# Patient Record
Sex: FEMALE | URBAN - METROPOLITAN AREA
[De-identification: names, ages, dates, MRNs, and addresses within clinical notes are randomized per-mention and may not be internally consistent; named-entity substitution may affect disease eponyms.]

---

## 2017-02-07 ENCOUNTER — APPOINTMENT (OUTPATIENT)
Age: 72
Setting detail: DERMATOLOGY
End: 2017-02-09

## 2017-02-07 DIAGNOSIS — D49.2 NEOPLASM OF UNSPECIFIED BEHAVIOR OF BONE, SOFT TISSUE, AND SKIN: ICD-10-CM

## 2017-02-07 DIAGNOSIS — L57.8 OTHER SKIN CHANGES DUE TO CHRONIC EXPOSURE TO NONIONIZING RADIATION: ICD-10-CM

## 2017-02-07 DIAGNOSIS — L81.4 OTHER MELANIN HYPERPIGMENTATION: ICD-10-CM

## 2017-02-07 DIAGNOSIS — D22 MELANOCYTIC NEVI: ICD-10-CM

## 2017-02-07 DIAGNOSIS — L82.1 OTHER SEBORRHEIC KERATOSIS: ICD-10-CM

## 2017-02-07 DIAGNOSIS — Z71.89 OTHER SPECIFIED COUNSELING: ICD-10-CM

## 2017-02-07 PROBLEM — D22.5 MELANOCYTIC NEVI OF TRUNK: Status: ACTIVE | Noted: 2017-02-07

## 2017-02-07 PROBLEM — D22.62 MELANOCYTIC NEVI OF LEFT UPPER LIMB, INCLUDING SHOULDER: Status: ACTIVE | Noted: 2017-02-07

## 2017-02-07 PROBLEM — D22.71 MELANOCYTIC NEVI OF RIGHT LOWER LIMB, INCLUDING HIP: Status: ACTIVE | Noted: 2017-02-07

## 2017-02-07 PROBLEM — D22.61 MELANOCYTIC NEVI OF RIGHT UPPER LIMB, INCLUDING SHOULDER: Status: ACTIVE | Noted: 2017-02-07

## 2017-02-07 PROBLEM — D22.72 MELANOCYTIC NEVI OF LEFT LOWER LIMB, INCLUDING HIP: Status: ACTIVE | Noted: 2017-02-07

## 2017-02-07 PROCEDURE — 11101: CPT

## 2017-02-07 PROCEDURE — 99203 OFFICE O/P NEW LOW 30 MIN: CPT | Mod: 25

## 2017-02-07 PROCEDURE — OTHER COUNSELING: OTHER

## 2017-02-07 PROCEDURE — OTHER BIOPSY BY SHAVE METHOD: OTHER

## 2017-02-07 PROCEDURE — 11100: CPT

## 2017-02-07 ASSESSMENT — LOCATION SIMPLE DESCRIPTION DERM
LOCATION SIMPLE: RIGHT THIGH
LOCATION SIMPLE: LEFT UPPER ARM
LOCATION SIMPLE: LEFT POSTERIOR UPPER ARM
LOCATION SIMPLE: LEFT HAND
LOCATION SIMPLE: RIGHT FOREHEAD
LOCATION SIMPLE: UPPER BACK
LOCATION SIMPLE: RIGHT CALF
LOCATION SIMPLE: RIGHT PRETIBIAL REGION
LOCATION SIMPLE: RIGHT CHEEK
LOCATION SIMPLE: LEFT CHEEK
LOCATION SIMPLE: LEFT FOREARM
LOCATION SIMPLE: RIGHT POSTERIOR THIGH
LOCATION SIMPLE: LEFT POSTERIOR THIGH
LOCATION SIMPLE: CHEST
LOCATION SIMPLE: LEFT CALF
LOCATION SIMPLE: ABDOMEN
LOCATION SIMPLE: RIGHT SHOULDER
LOCATION SIMPLE: RIGHT FOREARM
LOCATION SIMPLE: RIGHT LOWER BACK
LOCATION SIMPLE: LEFT THIGH
LOCATION SIMPLE: RIGHT POSTERIOR UPPER ARM
LOCATION SIMPLE: RIGHT UPPER ARM
LOCATION SIMPLE: LEFT UPPER BACK
LOCATION SIMPLE: RIGHT HAND
LOCATION SIMPLE: LEFT FOREHEAD

## 2017-02-07 ASSESSMENT — LOCATION DETAILED DESCRIPTION DERM
LOCATION DETAILED: RIGHT MEDIAL SUPERIOR CHEST
LOCATION DETAILED: LEFT VENTRAL DISTAL FOREARM
LOCATION DETAILED: INFERIOR THORACIC SPINE
LOCATION DETAILED: LEFT DISTAL CALF
LOCATION DETAILED: RIGHT PROXIMAL PRETIBIAL REGION
LOCATION DETAILED: LEFT DISTAL POSTERIOR THIGH
LOCATION DETAILED: LEFT SUPERIOR MEDIAL UPPER BACK
LOCATION DETAILED: RIGHT RADIAL DORSAL HAND
LOCATION DETAILED: RIGHT ANTERIOR PROXIMAL THIGH
LOCATION DETAILED: MIDDLE STERNUM
LOCATION DETAILED: LEFT LATERAL SUPERIOR CHEST
LOCATION DETAILED: LEFT SUPERIOR CENTRAL BUCCAL CHEEK
LOCATION DETAILED: RIGHT CENTRAL MALAR CHEEK
LOCATION DETAILED: LEFT CENTRAL MALAR CHEEK
LOCATION DETAILED: RIGHT DISTAL POSTERIOR UPPER ARM
LOCATION DETAILED: RIGHT VENTRAL DISTAL FOREARM
LOCATION DETAILED: RIGHT DISTAL POSTERIOR THIGH
LOCATION DETAILED: LEFT PROXIMAL DORSAL FOREARM
LOCATION DETAILED: RIGHT INFERIOR MEDIAL FOREHEAD
LOCATION DETAILED: RIGHT INFERIOR MEDIAL MIDBACK
LOCATION DETAILED: RIGHT PROXIMAL DORSAL FOREARM
LOCATION DETAILED: RIGHT ANTERIOR DISTAL UPPER ARM
LOCATION DETAILED: LEFT ANTERIOR DISTAL THIGH
LOCATION DETAILED: LEFT INFERIOR FOREHEAD
LOCATION DETAILED: RIGHT ANTERIOR SHOULDER
LOCATION DETAILED: LEFT ANTERIOR DISTAL UPPER ARM
LOCATION DETAILED: PERIUMBILICAL SKIN
LOCATION DETAILED: LEFT RADIAL DORSAL HAND
LOCATION DETAILED: LEFT INFERIOR LATERAL FOREHEAD
LOCATION DETAILED: LEFT PROXIMAL POSTERIOR UPPER ARM
LOCATION DETAILED: RIGHT PROXIMAL CALF

## 2017-02-07 ASSESSMENT — LOCATION ZONE DERM
LOCATION ZONE: HAND
LOCATION ZONE: LEG
LOCATION ZONE: FACE
LOCATION ZONE: TRUNK
LOCATION ZONE: ARM

## 2017-02-07 NOTE — PROCEDURE: BIOPSY BY SHAVE METHOD
Post-Care Instructions: I reviewed with the patient in detail post-care instructions. Patient is to keep the biopsy site dry overnight, and then apply bacitracin twice daily until healed. Patient may apply hydrogen peroxide soaks to remove any crusting.
Electrodesiccation And Curettage Text: The wound bed was treated with electrodesiccation and curettage after the biopsy was performed.
Curettage Text: The wound bed was treated with curettage after the biopsy was performed.
Render Post-Care Instructions In Note?: yes
Electrodesiccation Text: The wound bed was treated with electrodesiccation after the biopsy was performed.
Consent: Written consent was obtained and risks were reviewed including but not limited to scarring, infection, bleeding, scabbing, incomplete removal, nerve damage and allergy to anesthesia.
Additional Anesthesia Volume In Cc (Will Not Render If 0): 0
Silver Nitrate Text: The wound bed was treated with silver nitrate after the biopsy was performed.
Anesthesia Type: 1% lidocaine with epinephrine and a 1:10 solution of 8.4% sodium bicarbonate
Notification Instructions: Patient will be notified of biopsy results. However, patient instructed to call the office if not contacted within 2 weeks.
Bill For Surgical Tray: no
Anesthesia Volume In Cc (Will Not Render If 0): 1
Biopsy Method: Double edge Personna blades
Dressing: pressure dressing
Wound Care: Vaseline
Type Of Destruction Used: Curettage
Cryotherapy Text: The wound bed was treated with cryotherapy after the biopsy was performed.
Billing Type: Third-Party Bill
Detail Level: Detailed
Biopsy Type: H and E
Hemostasis: Drysol and Electrocautery

## 2017-11-07 ENCOUNTER — APPOINTMENT (OUTPATIENT)
Age: 72
Setting detail: DERMATOLOGY
End: 2017-11-08

## 2017-11-07 DIAGNOSIS — L82.1 OTHER SEBORRHEIC KERATOSIS: ICD-10-CM

## 2017-11-07 DIAGNOSIS — D22 MELANOCYTIC NEVI: ICD-10-CM

## 2017-11-07 DIAGNOSIS — D18.0 HEMANGIOMA: ICD-10-CM

## 2017-11-07 DIAGNOSIS — L57.8 OTHER SKIN CHANGES DUE TO CHRONIC EXPOSURE TO NONIONIZING RADIATION: ICD-10-CM

## 2017-11-07 DIAGNOSIS — Z71.89 OTHER SPECIFIED COUNSELING: ICD-10-CM

## 2017-11-07 DIAGNOSIS — L85.3 XEROSIS CUTIS: ICD-10-CM

## 2017-11-07 DIAGNOSIS — L81.4 OTHER MELANIN HYPERPIGMENTATION: ICD-10-CM

## 2017-11-07 PROBLEM — D18.01 HEMANGIOMA OF SKIN AND SUBCUTANEOUS TISSUE: Status: ACTIVE | Noted: 2017-11-07

## 2017-11-07 PROBLEM — D22.39 MELANOCYTIC NEVI OF OTHER PARTS OF FACE: Status: ACTIVE | Noted: 2017-11-07

## 2017-11-07 PROBLEM — D22.5 MELANOCYTIC NEVI OF TRUNK: Status: ACTIVE | Noted: 2017-11-07

## 2017-11-07 PROBLEM — D22.72 MELANOCYTIC NEVI OF LEFT LOWER LIMB, INCLUDING HIP: Status: ACTIVE | Noted: 2017-11-07

## 2017-11-07 PROBLEM — D22.61 MELANOCYTIC NEVI OF RIGHT UPPER LIMB, INCLUDING SHOULDER: Status: ACTIVE | Noted: 2017-11-07

## 2017-11-07 PROBLEM — D22.71 MELANOCYTIC NEVI OF RIGHT LOWER LIMB, INCLUDING HIP: Status: ACTIVE | Noted: 2017-11-07

## 2017-11-07 PROBLEM — D22.62 MELANOCYTIC NEVI OF LEFT UPPER LIMB, INCLUDING SHOULDER: Status: ACTIVE | Noted: 2017-11-07

## 2017-11-07 PROCEDURE — OTHER COUNSELING: OTHER

## 2017-11-07 PROCEDURE — OTHER REASSURANCE: OTHER

## 2017-11-07 PROCEDURE — OTHER MIPS QUALITY: OTHER

## 2017-11-07 PROCEDURE — 99214 OFFICE O/P EST MOD 30 MIN: CPT

## 2017-11-07 ASSESSMENT — LOCATION DETAILED DESCRIPTION DERM
LOCATION DETAILED: LEFT PROXIMAL PRETIBIAL REGION
LOCATION DETAILED: LEFT CHIN
LOCATION DETAILED: UPPER STERNUM
LOCATION DETAILED: LEFT LATERAL PROXIMAL PRETIBIAL REGION
LOCATION DETAILED: LEFT PROXIMAL DORSAL FOREARM
LOCATION DETAILED: RIGHT PROXIMAL DORSAL FOREARM
LOCATION DETAILED: RIGHT ANTERIOR DISTAL THIGH
LOCATION DETAILED: LEFT INFERIOR CENTRAL MALAR CHEEK
LOCATION DETAILED: LEFT CENTRAL BUCCAL CHEEK
LOCATION DETAILED: LEFT VENTRAL PROXIMAL FOREARM
LOCATION DETAILED: RIGHT VENTRAL PROXIMAL FOREARM
LOCATION DETAILED: SUPERIOR THORACIC SPINE
LOCATION DETAILED: XIPHOID
LOCATION DETAILED: LOWER STERNUM
LOCATION DETAILED: RIGHT PROXIMAL RADIAL DORSAL FOREARM
LOCATION DETAILED: RIGHT DISTAL CALF
LOCATION DETAILED: LEFT ANTERIOR DISTAL THIGH
LOCATION DETAILED: MIDDLE STERNUM
LOCATION DETAILED: RIGHT PROXIMAL PRETIBIAL REGION
LOCATION DETAILED: LEFT PROXIMAL CALF

## 2017-11-07 ASSESSMENT — LOCATION ZONE DERM
LOCATION ZONE: TRUNK
LOCATION ZONE: LEG
LOCATION ZONE: ARM
LOCATION ZONE: FACE

## 2017-11-07 ASSESSMENT — LOCATION SIMPLE DESCRIPTION DERM
LOCATION SIMPLE: RIGHT CALF
LOCATION SIMPLE: RIGHT PRETIBIAL REGION
LOCATION SIMPLE: LEFT THIGH
LOCATION SIMPLE: UPPER BACK
LOCATION SIMPLE: RIGHT FOREARM
LOCATION SIMPLE: ABDOMEN
LOCATION SIMPLE: LEFT FOREARM
LOCATION SIMPLE: RIGHT THIGH
LOCATION SIMPLE: LEFT PRETIBIAL REGION
LOCATION SIMPLE: LEFT CALF
LOCATION SIMPLE: LEFT CHEEK
LOCATION SIMPLE: CHEST
LOCATION SIMPLE: CHIN

## 2017-11-07 NOTE — PROCEDURE: MIPS QUALITY
Quality 111:Pneumonia Vaccination Status For Older Adults: Pneumococcal Vaccination Previously Received
Detail Level: Zone
Quality 226: Preventive Care And Screening: Tobacco Use: Screening And Cessation Intervention: Patient screened for tobacco and never smoked
Quality 431: Preventive Care And Screening: Unhealthy Alcohol Use - Screening: Patient screened for unhealthy alcohol use using a single question and scores less than 2 times per year
Quality 110: Preventive Care And Screening: Influenza Immunization: Influenza Immunization not Administered because Patient Refused.

## 2018-09-11 ENCOUNTER — APPOINTMENT (OUTPATIENT)
Age: 73
Setting detail: DERMATOLOGY
End: 2018-09-11

## 2018-09-11 DIAGNOSIS — D22 MELANOCYTIC NEVI: ICD-10-CM

## 2018-09-11 DIAGNOSIS — D18.0 HEMANGIOMA: ICD-10-CM

## 2018-09-11 DIAGNOSIS — L57.8 OTHER SKIN CHANGES DUE TO CHRONIC EXPOSURE TO NONIONIZING RADIATION: ICD-10-CM

## 2018-09-11 DIAGNOSIS — Z71.89 OTHER SPECIFIED COUNSELING: ICD-10-CM

## 2018-09-11 DIAGNOSIS — L82.1 OTHER SEBORRHEIC KERATOSIS: ICD-10-CM

## 2018-09-11 DIAGNOSIS — L81.4 OTHER MELANIN HYPERPIGMENTATION: ICD-10-CM

## 2018-09-11 PROBLEM — D22.62 MELANOCYTIC NEVI OF LEFT UPPER LIMB, INCLUDING SHOULDER: Status: ACTIVE | Noted: 2018-09-11

## 2018-09-11 PROBLEM — D22.71 MELANOCYTIC NEVI OF RIGHT LOWER LIMB, INCLUDING HIP: Status: ACTIVE | Noted: 2018-09-11

## 2018-09-11 PROBLEM — D22.39 MELANOCYTIC NEVI OF OTHER PARTS OF FACE: Status: ACTIVE | Noted: 2018-09-11

## 2018-09-11 PROBLEM — D22.72 MELANOCYTIC NEVI OF LEFT LOWER LIMB, INCLUDING HIP: Status: ACTIVE | Noted: 2018-09-11

## 2018-09-11 PROBLEM — D22.61 MELANOCYTIC NEVI OF RIGHT UPPER LIMB, INCLUDING SHOULDER: Status: ACTIVE | Noted: 2018-09-11

## 2018-09-11 PROBLEM — D22.5 MELANOCYTIC NEVI OF TRUNK: Status: ACTIVE | Noted: 2018-09-11

## 2018-09-11 PROBLEM — D18.01 HEMANGIOMA OF SKIN AND SUBCUTANEOUS TISSUE: Status: ACTIVE | Noted: 2018-09-11

## 2018-09-11 PROCEDURE — OTHER COUNSELING: OTHER

## 2018-09-11 PROCEDURE — 99214 OFFICE O/P EST MOD 30 MIN: CPT

## 2018-09-11 PROCEDURE — OTHER MIPS QUALITY: OTHER

## 2018-09-11 PROCEDURE — OTHER REASSURANCE: OTHER

## 2018-09-11 ASSESSMENT — LOCATION DETAILED DESCRIPTION DERM
LOCATION DETAILED: XIPHOID
LOCATION DETAILED: LEFT CENTRAL BUCCAL CHEEK
LOCATION DETAILED: RIGHT PROXIMAL PRETIBIAL REGION
LOCATION DETAILED: LEFT PROXIMAL DORSAL FOREARM
LOCATION DETAILED: LEFT LATERAL PROXIMAL PRETIBIAL REGION
LOCATION DETAILED: LOWER STERNUM
LOCATION DETAILED: MIDDLE STERNUM
LOCATION DETAILED: LEFT ANTERIOR DISTAL THIGH
LOCATION DETAILED: LEFT CHIN
LOCATION DETAILED: UPPER STERNUM
LOCATION DETAILED: LEFT INFERIOR CENTRAL MALAR CHEEK
LOCATION DETAILED: LEFT VENTRAL PROXIMAL FOREARM
LOCATION DETAILED: RIGHT PROXIMAL DORSAL FOREARM
LOCATION DETAILED: RIGHT VENTRAL PROXIMAL FOREARM
LOCATION DETAILED: RIGHT ANTERIOR DISTAL THIGH
LOCATION DETAILED: LEFT PROXIMAL PRETIBIAL REGION

## 2018-09-11 ASSESSMENT — LOCATION SIMPLE DESCRIPTION DERM
LOCATION SIMPLE: LEFT THIGH
LOCATION SIMPLE: CHIN
LOCATION SIMPLE: CHEST
LOCATION SIMPLE: LEFT FOREARM
LOCATION SIMPLE: RIGHT PRETIBIAL REGION
LOCATION SIMPLE: RIGHT THIGH
LOCATION SIMPLE: LEFT PRETIBIAL REGION
LOCATION SIMPLE: LEFT CHEEK
LOCATION SIMPLE: RIGHT FOREARM
LOCATION SIMPLE: ABDOMEN

## 2018-09-11 ASSESSMENT — LOCATION ZONE DERM
LOCATION ZONE: FACE
LOCATION ZONE: ARM
LOCATION ZONE: TRUNK
LOCATION ZONE: LEG

## 2018-09-11 NOTE — PROCEDURE: MIPS QUALITY
Quality 226: Preventive Care And Screening: Tobacco Use: Screening And Cessation Intervention: Patient screened for tobacco and never smoked
Quality 110: Preventive Care And Screening: Influenza Immunization: Influenza Immunization previously received during influenza season
Quality 111:Pneumonia Vaccination Status For Older Adults: Pneumococcal Vaccination Previously Received
Quality 431: Preventive Care And Screening: Unhealthy Alcohol Use - Screening: Patient screened for unhealthy alcohol use using a single question and scores 2 or greater episodes per year and brief intervention did not occur
Detail Level: Zone

## 2021-05-26 NOTE — PROCEDURE: REASSURANCE
You will need to return to clinic in 48-72 hours to have results of TB test read. Please return to clinic on 5/28 after 2:40pm or on 5/29 before 2:40pm to have your TB test read.     If you do not return during this time your test will need to be repeat
Include Location In Plan?: No
Detail Level: Zone